# Patient Record
Sex: MALE | Race: WHITE
[De-identification: names, ages, dates, MRNs, and addresses within clinical notes are randomized per-mention and may not be internally consistent; named-entity substitution may affect disease eponyms.]

---

## 2018-08-08 ENCOUNTER — HOSPITAL ENCOUNTER (EMERGENCY)
Dept: HOSPITAL 45 - C.EDB | Age: 43
Discharge: HOME | End: 2018-08-08
Payer: COMMERCIAL

## 2018-08-08 VITALS
WEIGHT: 285.94 LBS | HEIGHT: 65.98 IN | BODY MASS INDEX: 45.95 KG/M2 | BODY MASS INDEX: 45.95 KG/M2 | WEIGHT: 285.94 LBS | HEIGHT: 65.98 IN

## 2018-08-08 VITALS — OXYGEN SATURATION: 97 % | DIASTOLIC BLOOD PRESSURE: 76 MMHG | SYSTOLIC BLOOD PRESSURE: 127 MMHG | HEART RATE: 76 BPM

## 2018-08-08 VITALS — TEMPERATURE: 98.06 F

## 2018-08-08 DIAGNOSIS — Z79.899: ICD-10-CM

## 2018-08-08 DIAGNOSIS — R68.89: ICD-10-CM

## 2018-08-08 DIAGNOSIS — R35.0: Primary | ICD-10-CM

## 2018-08-08 LAB
ALBUMIN SERPL-MCNC: 3.5 GM/DL (ref 3.4–5)
ALP SERPL-CCNC: 52 U/L (ref 45–117)
ALT SERPL-CCNC: 21 U/L (ref 12–78)
AST SERPL-CCNC: 15 U/L (ref 15–37)
BASOPHILS # BLD: 0.01 K/UL (ref 0–0.2)
BASOPHILS NFR BLD: 0.1 %
BUN SERPL-MCNC: 17 MG/DL (ref 7–18)
CALCIUM SERPL-MCNC: 9.2 MG/DL (ref 8.5–10.1)
CO2 SERPL-SCNC: 26 MMOL/L (ref 21–32)
CREAT SERPL-MCNC: 1.24 MG/DL (ref 0.6–1.4)
EOS ABS #: 0.07 K/UL (ref 0–0.5)
EOSINOPHIL NFR BLD AUTO: 200 K/UL (ref 130–400)
GLUCOSE SERPL-MCNC: 108 MG/DL (ref 70–99)
HCT VFR BLD CALC: 42.7 % (ref 42–52)
HGB BLD-MCNC: 15.2 G/DL (ref 14–18)
IG#: 0.03 K/UL (ref 0–0.02)
IMM GRANULOCYTES NFR BLD AUTO: 14.6 %
LIPASE: 80 U/L (ref 73–393)
LYMPHOCYTES # BLD: 1.79 K/UL (ref 1.2–3.4)
MCH RBC QN AUTO: 32.4 PG (ref 25–34)
MCHC RBC AUTO-ENTMCNC: 35.6 G/DL (ref 32–36)
MCV RBC AUTO: 91 FL (ref 80–100)
MONO ABS #: 1.27 K/UL (ref 0.11–0.59)
MONOCYTES NFR BLD: 10.4 %
NEUT ABS #: 9.09 K/UL (ref 1.4–6.5)
NEUTROPHILS # BLD AUTO: 0.6 %
NEUTROPHILS NFR BLD AUTO: 74.1 %
PMV BLD AUTO: 11.7 FL (ref 7.4–10.4)
POTASSIUM SERPL-SCNC: 4.1 MMOL/L (ref 3.5–5.1)
PROT SERPL-MCNC: 7.5 GM/DL (ref 6.4–8.2)
RED CELL DISTRIBUTION WIDTH CV: 12.9 % (ref 11.5–14.5)
RED CELL DISTRIBUTION WIDTH SD: 43.1 FL (ref 36.4–46.3)
SODIUM SERPL-SCNC: 136 MMOL/L (ref 136–145)
WBC # BLD AUTO: 12.26 K/UL (ref 4.8–10.8)

## 2018-08-08 NOTE — EMERGENCY ROOM VISIT NOTE
History


Report prepared by Mannie:  Hadley Ding


Under the Supervision of:  Dr. Otilio Garrett M.D.


First contact with patient:  08:28


Chief Complaint:  URINARY SYMPTOMS


Stated Complaint:  FEVER,FREQUENT URINATION





History of Present Illness


The patient is a 42 year old male who presents to the Emergency Room with 

complaints of increased urinary frequency that he has been experiencing for the 

past week. The patient states that over the past week when he feels the need to 

go he needs to go "right now." He has urinated on himself before making it to 

the restroom a couple of times. He does describe a "warm feeling" and "stinging

" sensations when urinating. He has no history of urinary/prostate issues. The 

patient also complains of intermittent headaches and night sweats/chills over 

the past week as well.





   Source of History:  patient


   Onset:  Past week


   Position:  other ()


   Quality:  other (Urinary frequency)


   Timing:  worsening (increasing urinary frequency)


   Associated Symptoms:  + chills, + headache





Review of Systems


See HPI for pertinent positives & negatives. A total of 10 systems reviewed and 

were otherwise negative.





Past Medical & Surgical


Hx of Hypertension.





Social History


Smoking Status:  Never Smoker


Marital Status:  


Housing Status:  lives with significant other


Occupation Status:  employed





Current/Historical Medications


Scheduled


Acetaminophen (Tylenol), 325 MG PO UD


Ciprofloxacin Hcl (Cipro), 1 TAB PO BID


Lisinopril (Zestril), 30 MG PO QAM


Metoprolol Tartrate (Lopressor) (Lopressor), 50 MG PO BID


Omeprazole (Prilosec), 40 MG PO QAM





Scheduled PRN


Acetaminophen/Codeine (Tylenol W/Codeine #3), 1-2 TABS PO Q6 PRN for Pain





Allergies


Coded Allergies:  


     No Known Allergies (Unverified , 8/8/18)





Physical Exam


Vital Signs











  Date Time  Temp Pulse Resp B/P (MAP) Pulse Ox O2 Delivery O2 Flow Rate FiO2


 


8/8/18 09:57  76 20 127/76 97 Room Air  


 


8/8/18 08:22 36.7 82 20 151/95 97 Room Air  











Physical Exam


GENERAL: Awake, alert, well-appearing, in no acute distress


HENT: Normocephalic, atraumatic. Oropharynx unremarkable.


EYES: Normal conjunctiva. Sclera non-icteric.


NECK: Supple. No nuchal rigidity. FROM. No JVD.


RESPIRATORY: Clear to auscultation.


CARDIAC: Regular rate, normal rhythm. Extremities warm and well perfused. 

Pulses equal.


ABDOMEN: Soft, non-distended. No tenderness to palpation. No rebound or 

guarding. No masses.


RECTAL: Deferred.


MUSCULOSKELETAL: Chest examination reveals no tenderness. The back is 

symmetrical on inspection without obvious abnormality. There is no CVA 

tenderness to palpation. No joint edema. 


LOWER EXTREMITIES: Calves are equal size bilaterally and non-tender. No edema. 

No discoloration. 


NEURO: Normal sensorium. No sensory or motor deficits noted. 


SKIN: No rash or jaundice noted.





Medical Decision & Procedures


ER Provider


Diagnostic Interpretation:


Radiology results as stated below per my review and radiologist interpretation:





KUB





CLINICAL HISTORY: Dysuria.





FINDINGS: 2 AP supine abdominal radiographs are obtained. No prior studies are


available for comparison at the time of dictation. There is a nonobstructed


abdominal bowel gas pattern. Mild/moderate colonic fecal retention is observed.


Cholecystectomy clips are seen in the right upper quadrant. A 4 mm


nonobstructing calculus is questioned in the left kidney. No calcifications are


seen projecting over the right kidney. A 5 mm calcification is seen in the left


pelvis and projects over the left vesicoureteral junction. The bony structures


appear intact.





IMPRESSION:





1. Question a 4 mm nonobstructing left renal calculus.





2. A 5 mm calcification is seen in the left pelvis and projects over the left


vesicoureteral junction. Correlate clinically for a history of left flank pain


and hematuria. This could present a phlebolith versus a distal ureteral stone.





3. No bowel obstruction.











Electronically signed by:  Calixto Romo M.D.


8/8/2018 9:35 AM





Dictated Date/Time:  8/8/2018 9:32 AM











(AMANDA/BLANATALY)RETROPERITON COMP





CLINICAL HISTORY: 42 years-old Male presenting with ??? Prostatitis? UTI. 





TECHNIQUE: Real-time grayscale and limited color Doppler ultrasound imaging of


the kidneys and bladder was performed. 





COMPARISON: None.





FINDINGS:





Right kidney: Normal echogenicity of renal parenchyma. Right kidney measures


11.9 cm. No hydronephrosis. No convincing evidence of calculus or mass. 





Left kidney: Normal echogenicity of renal parenchyma. Left kidney measures 11.5


cm. No hydronephrosis. No convincing evidence of calculus or mass.





Bladder: Circumferential bladder wall thickening allowing for underdistention of


the bladder. Bilateral ureteral jets present.





Other: None.





IMPRESSION:  


1.  No hydronephrosis.





2.  Circumferential bladder wall thickening could indicate cystitis. Given the


patient's age this is less likely to represent chronic bladder outlet


obstruction in the setting of prostatomegaly. Correlate with urinalysis. 











Electronically signed by:  Misael Asencio M.D.


8/8/2018 10:00 AM





Dictated Date/Time:  8/8/2018 9:59 AM





Laboratory Results


8/8/18 08:45








Red Blood Count 4.69, Mean Corpuscular Volume 91.0, Mean Corpuscular Hemoglobin 

32.4, Mean Corpuscular Hemoglobin Concent 35.6, Mean Platelet Volume 11.7, 

Neutrophils (%) (Auto) 74.1, Lymphocytes (%) (Auto) 14.6, Monocytes (%) (Auto) 

10.4, Eosinophils (%) (Auto) 0.6, Basophils (%) (Auto) 0.1, Neutrophils # (Auto

) 9.09, Lymphocytes # (Auto) 1.79, Monocytes # (Auto) 1.27, Eosinophils # (Auto

) 0.07, Basophils # (Auto) 0.01





8/8/18 08:45

















Test


  8/8/18


08:31 8/8/18


08:45


 


Urine Color DK YELLOW  


 


Urine Appearance CLEAR (CLEAR)  


 


Urine pH 5.0 (4.5-7.5)  


 


Urine Specific Gravity


  1.035


(1.000-1.030) 


 


 


Urine Protein 1+ (NEG)  


 


Urine Glucose (UA) NEG (NEG)  


 


Urine Ketones TRACE (NEG)  


 


Urine Occult Blood NEG (NEG)  


 


Urine Nitrite NEG (NEG)  


 


Urine Bilirubin NEG (NEG)  


 


Urine Urobilinogen NEG (NEG)  


 


Urine Leukocyte Esterase SMALL (NEG)  


 


Urine WBC (Auto) >30 /hpf (0-5)  


 


Urine RBC (Auto) 0-4 /hpf (0-4)  


 


Urine Hyaline Casts (Auto)


  5-10 /lpf


(0-5) 


 


 


Urine Epithelial Cells (Auto)


  20-30 /lpf


(0-5) 


 


 


Urine Bacteria (Auto) NEG (NEG)  


 


White Blood Count


  


  12.26 K/uL


(4.8-10.8)


 


Red Blood Count


  


  4.69 M/uL


(4.7-6.1)


 


Hemoglobin


  


  15.2 g/dL


(14.0-18.0)


 


Hematocrit  42.7 % (42-52) 


 


Mean Corpuscular Volume


  


  91.0 fL


()


 


Mean Corpuscular Hemoglobin


  


  32.4 pg


(25-34)


 


Mean Corpuscular Hemoglobin


Concent 


  35.6 g/dl


(32-36)


 


Platelet Count


  


  200 K/uL


(130-400)


 


Mean Platelet Volume


  


  11.7 fL


(7.4-10.4)


 


Neutrophils (%) (Auto)  74.1 % 


 


Lymphocytes (%) (Auto)  14.6 % 


 


Monocytes (%) (Auto)  10.4 % 


 


Eosinophils (%) (Auto)  0.6 % 


 


Basophils (%) (Auto)  0.1 % 


 


Neutrophils # (Auto)


  


  9.09 K/uL


(1.4-6.5)


 


Lymphocytes # (Auto)


  


  1.79 K/uL


(1.2-3.4)


 


Monocytes # (Auto)


  


  1.27 K/uL


(0.11-0.59)


 


Eosinophils # (Auto)


  


  0.07 K/uL


(0-0.5)


 


Basophils # (Auto)


  


  0.01 K/uL


(0-0.2)


 


RDW Standard Deviation


  


  43.1 fL


(36.4-46.3)


 


RDW Coefficient of Variation


  


  12.9 %


(11.5-14.5)


 


Immature Granulocyte % (Auto)  0.2 % 


 


Immature Granulocyte # (Auto)


  


  0.03 K/uL


(0.00-0.02)


 


Anion Gap


  


  6.0 mmol/L


(3-11)


 


Est Creatinine Clear Calc


Drug Dose 


  98.9 ml/min 


 


 


Estimated GFR (


American) 


  82.6 


 


 


Estimated GFR (Non-


American 


  71.3 


 


 


BUN/Creatinine Ratio  14.0 (10-20) 


 


Calcium Level


  


  9.2 mg/dl


(8.5-10.1)


 


Total Bilirubin


  


  0.8 mg/dl


(0.2-1)


 


Direct Bilirubin


  


  0.2 mg/dl


(0-0.2)


 


Aspartate Amino Transf


(AST/SGOT) 


  15 U/L (15-37) 


 


 


Alanine Aminotransferase


(ALT/SGPT) 


  21 U/L (12-78) 


 


 


Alkaline Phosphatase


  


  52 U/L


()


 


Total Protein


  


  7.5 gm/dl


(6.4-8.2)


 


Albumin


  


  3.5 gm/dl


(3.4-5.0)


 


Lipase


  


  80 U/L


()





Labs reviewed by ED physician.





Medications Administered











 Medications


  (Trade)  Dose


 Ordered  Sig/Darcy


 Route  Start Time


 Stop Time Status Last Admin


Dose Admin


 


 Sodium Chloride  1,000 ml @ 


 999 mls/hr  Q1H1M STAT


 IV  8/8/18 08:36


 8/8/18 09:36 DC 8/8/18 08:52


999 MLS/HR


 


 Ceftriaxone Sodium


  (Rocephin Inj)  1 gm  NOW  STAT


 IV  8/8/18 08:36


 8/8/18 08:40 DC 8/8/18 08:53


1 GM


 


 Trimethoprim/


 Sulfamethoxazole


  (Septra Ds 800/


 160MG Tab)  1 tab  NOW  STAT


 PO  8/8/18 08:36


 8/8/18 08:40 DC 8/8/18 08:53


1 TAB











ED Course


0831: Past medical records reviewed. The patient was evaluated in room C7. A 

complete history and physical examination was performed. 





1018: Upon reexamination the patient is resting in bed. I discussed results and 

treatment plan with the patient. He verbalizes agreement and understanding. The 

patient is ready for discharge.





Medical Decision


Prior records/ancillary studies reviewed.


Triage Nursing notes reviewed.





Differential diagnosis:


Etiologies such as appendicitis, diverticulitis, PUD, biliary pathology, UTI, 

pancreatitis, obstruction, mesenteric ischemia, aortic pathology, infections, 

inflammatory bowel disease, renal colic, as well as others were entertained. 





This is a 42-year-old male who presents emergency department complaining of 

urinary frequency.  He does not report any loss of bowel or bladder control and 

is having no difficulty walking.  In addition he has no pain bilaterally to his 

legs.  Based on these findings I suspect that the patient is suffering from 

UTI.  He was sent for an ultrasound of his kidneys and bladder which does not 

show any evidence of hydronephrosis however the patient may have a cystitis as 

his urine is also appears to be infected.  For this reason the patient was 

given IV dose of Rocephin and started on Cipro.  I strongly recommended that 

the patient follow-up with urology.  Serial abdominal examinations were 

performed on the patient in the emergency department and at no time to the 

patient exhibited a surgical abdomen or even abdominal tenderness.  Based on 

these findings I feel the patient can be safely discharged home and placed on 

Cipro.  Patient was in agreement with the treatment plan.





Medication Reconcilliation


Current Medication List:  was personally reviewed by me





Blood Pressure Screening


Blood pressure disposition:  Elevated BP felt to be situational





Impression





 Primary Impression:  


 Symptoms of urinary tract infection





Scribe Attestation


The scribe's documentation has been prepared under my direction and personally 

reviewed by me in its entirety. I confirm that the note above accurately 

reflects all work, treatment, procedures, and medical decision making performed 

by me.





Departure Information


Dispostion


Home / Self-Care





Prescriptions





Acetaminophen/Codeine (Tylenol W/Codeine #3) 300 Mg/30 Mg Tab


1-2 TABS PO Q6 Y for Pain, #14 TAB


   Prov: Otilio Garrett MD         8/8/18 


Ciprofloxacin Hcl (CIPRO) 500 Mg Tab


1 TAB PO BID for 14 Days, #28 TAB


   Prov: Otilio Garrett MD         8/8/18





Forms


HOME CARE DOCUMENTATION FORM,                                                 

               IMPORTANT VISIT INFORMATION





Patient Instructions


My [facility]





Additional Instructions





Follow up with Dr Calzada's office





Return if severe pain or uncontrolled fevers





You received narcotic or benzodiazepene medication while in the emergency room 

today.  This is an addictive medication that may cause drowziness as well as 

constipation.  Do not drive, operate heavy machinery, or drink alcohol under 

the influence of this medication.  





Take 600 mg Ibuprofen every 6 hours


Take Tylenol #3 for breakthrough pain








Culture results are usually available in approx 48 hours








You have been examined and treated today on an emergency basis only. This is 

not a substitute for, or an effort to provide, complete comprehensive medical 

care. It is impossible to recognize and treat all injuries or illnesses in a 

single emergency department visit. It is therefore important that you follow up 

closely with your PCP.  Call as soon as possible for an appointment.  





Thank you for your time and consideration.  I look forward to speaking with you 

again soon.  Please don't hesitate to call us if you have any questions.

## 2018-08-08 NOTE — DIAGNOSTIC IMAGING REPORT
(AMANDA/BLAD)RETROPERITON COMP



CLINICAL HISTORY: 42 years-old Male presenting with ??? Prostatitis? UTI. 



TECHNIQUE: Real-time grayscale and limited color Doppler ultrasound imaging of

the kidneys and bladder was performed. 



COMPARISON: None.



FINDINGS:



Right kidney: Normal echogenicity of renal parenchyma. Right kidney measures

11.9 cm. No hydronephrosis. No convincing evidence of calculus or mass. 



Left kidney: Normal echogenicity of renal parenchyma. Left kidney measures 11.5

cm. No hydronephrosis. No convincing evidence of calculus or mass.



Bladder: Circumferential bladder wall thickening allowing for underdistention of

the bladder. Bilateral ureteral jets present.



Other: None.



IMPRESSION:  

1.  No hydronephrosis.



2.  Circumferential bladder wall thickening could indicate cystitis. Given the

patient's age this is less likely to represent chronic bladder outlet

obstruction in the setting of prostatomegaly. Correlate with urinalysis. 







Electronically signed by:  Misael Asencio M.D.

8/8/2018 10:00 AM



Dictated Date/Time:  8/8/2018 9:59 AM

## 2018-08-08 NOTE — DIAGNOSTIC IMAGING REPORT
KUB



CLINICAL HISTORY: Dysuria.



FINDINGS: 2 AP supine abdominal radiographs are obtained. No prior studies are

available for comparison at the time of dictation. There is a nonobstructed

abdominal bowel gas pattern. Mild/moderate colonic fecal retention is observed.

Cholecystectomy clips are seen in the right upper quadrant. A 4 mm

nonobstructing calculus is questioned in the left kidney. No calcifications are

seen projecting over the right kidney. A 5 mm calcification is seen in the left

pelvis and projects over the left vesicoureteral junction. The bony structures

appear intact.



IMPRESSION:



1. Question a 4 mm nonobstructing left renal calculus.



2. A 5 mm calcification is seen in the left pelvis and projects over the left

vesicoureteral junction. Correlate clinically for a history of left flank pain

and hematuria. This could present a phlebolith versus a distal ureteral stone.



3. No bowel obstruction.







Electronically signed by:  Calixto Romo M.D.

8/8/2018 9:35 AM



Dictated Date/Time:  8/8/2018 9:32 AM

## 2018-08-10 NOTE — PHARMACY PROGRESS NOTE
ED Pharmacist Culture FollowUp


Date of Service:


Aug 10, 2018.


Patient's urine culture growing E. coli  @ 40,000 CFU/ml, patient was 

symptomatic complaining of urinary frequency, urgency, ultrasound showed 

bladder wall thickening that could indicate cystitis. Given patients symptoms, 

patient was prescribed ciprofloxacin at visit. E. coli growing is resistnat to 

ciprofloxacin, levofloxacin, gentamicin and tobramycin. Discussed case w/ Dr. Thakkar who authorized change of prescription to keflex 500 mg BID x 7 days. 

Called patient and discussed change to new antibiotic and could stop 

ciprofloxacin. Patient understood and had not further questions. Prescription 

for keflex 500 mg BID x 7 days called into [facility] [place] at patient's 

request.